# Patient Record
Sex: FEMALE | Race: WHITE | NOT HISPANIC OR LATINO
[De-identification: names, ages, dates, MRNs, and addresses within clinical notes are randomized per-mention and may not be internally consistent; named-entity substitution may affect disease eponyms.]

---

## 2021-08-27 ENCOUNTER — TRANSCRIPTION ENCOUNTER (OUTPATIENT)
Age: 26
End: 2021-08-27

## 2024-02-08 PROBLEM — Z00.00 ENCOUNTER FOR PREVENTIVE HEALTH EXAMINATION: Status: ACTIVE | Noted: 2024-02-08

## 2024-02-22 ENCOUNTER — NON-APPOINTMENT (OUTPATIENT)
Age: 29
End: 2024-02-22

## 2024-02-26 ENCOUNTER — APPOINTMENT (OUTPATIENT)
Dept: PLASTIC SURGERY | Facility: CLINIC | Age: 29
End: 2024-02-26
Payer: SELF-PAY

## 2024-02-26 VITALS
TEMPERATURE: 98.2 F | HEART RATE: 101 BPM | RESPIRATION RATE: 16 BRPM | DIASTOLIC BLOOD PRESSURE: 93 MMHG | HEIGHT: 67 IN | WEIGHT: 145 LBS | SYSTOLIC BLOOD PRESSURE: 145 MMHG | BODY MASS INDEX: 22.76 KG/M2 | OXYGEN SATURATION: 99 %

## 2024-02-26 DIAGNOSIS — Z78.9 OTHER SPECIFIED HEALTH STATUS: ICD-10-CM

## 2024-02-26 PROCEDURE — 99202 OFFICE O/P NEW SF 15 MIN: CPT

## 2024-02-26 RX ORDER — MULTIVITAMIN
CAPSULE ORAL
Refills: 0 | Status: DISCONTINUED | COMMUNITY
End: 2024-02-26

## 2024-04-02 ENCOUNTER — APPOINTMENT (OUTPATIENT)
Dept: PLASTIC SURGERY | Facility: CLINIC | Age: 29
End: 2024-04-02
Payer: SELF-PAY

## 2024-04-02 VITALS — WEIGHT: 145 LBS | TEMPERATURE: 98 F | BODY MASS INDEX: 22.76 KG/M2 | HEIGHT: 67 IN

## 2024-04-02 PROCEDURE — 99203 OFFICE O/P NEW LOW 30 MIN: CPT

## 2024-04-02 NOTE — HISTORY OF PRESENT ILLNESS
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 She expressed cosmetic concerns, particularly about her protruding ears and the shape of her left ear, which have made her self-conscious. However, she reports no functional issues such as hearing loss or a history of frequent ear infections.

## 2024-05-07 ENCOUNTER — APPOINTMENT (OUTPATIENT)
Dept: PLASTIC SURGERY | Facility: CLINIC | Age: 29
End: 2024-05-07
Payer: SELF-PAY

## 2024-05-07 PROCEDURE — 69300 REVISE EXTERNAL EAR: CPT

## 2024-05-07 RX ORDER — CEPHALEXIN 500 MG/1
500 TABLET ORAL
Qty: 6 | Refills: 0 | Status: ACTIVE | COMMUNITY
Start: 2024-05-07 | End: 1900-01-01

## 2024-05-07 RX ORDER — OXYCODONE AND ACETAMINOPHEN 5; 325 MG/1; MG/1
5-325 TABLET ORAL
Qty: 10 | Refills: 0 | Status: ACTIVE | COMMUNITY
Start: 2024-05-07 | End: 1900-01-01

## 2024-05-07 NOTE — PROCEDURE
[FreeTextEntry6] : preopdx dx: prominent ear, lop ear deformity procedure: otoplasty  lido w/ epi min none IC obtained antihelical fold marked.  retroauricular sulcus demarcated.  lido w/ epi injected.  15 blade used to incise skin to the cartilage.  subperichondrial plane dissected.  mustarde sutures placed with 4-0 clear nylon x4.  furnas suture placed.  Super helix skin everted.  Bolster placed to widen sulcus of super scalpha.  .  skin closed with 4-0 monocryl.  contralateral identical procedure performed but without bolster.

## 2024-05-07 NOTE — REASON FOR VISIT
[Procedure: _________] : a [unfilled] procedure visit [FreeTextEntry1] : otoplasty for prominent ear deformity

## 2024-05-10 ENCOUNTER — APPOINTMENT (OUTPATIENT)
Dept: PLASTIC SURGERY | Facility: CLINIC | Age: 29
End: 2024-05-10
Payer: SELF-PAY

## 2024-05-10 PROCEDURE — 99024 POSTOP FOLLOW-UP VISIT: CPT

## 2024-05-10 NOTE — HISTORY OF PRESENT ILLNESS
[FreeTextEntry1] : Dop:5/7/24 S/P:  otoplasty for prominent ear deformity. Patient is doing well, no complaints.

## 2024-05-15 ENCOUNTER — APPOINTMENT (OUTPATIENT)
Dept: PLASTIC SURGERY | Facility: CLINIC | Age: 29
End: 2024-05-15
Payer: SELF-PAY

## 2024-05-15 PROCEDURE — 99024 POSTOP FOLLOW-UP VISIT: CPT

## 2024-05-15 NOTE — HISTORY OF PRESENT ILLNESS
[FreeTextEntry1] : Dop:5/7/24 S/P: otoplasty for prominent ear deformity.  And ear deformity on left side Bolster in place superior left helical rim

## 2024-05-30 ENCOUNTER — APPOINTMENT (OUTPATIENT)
Dept: PLASTIC SURGERY | Facility: CLINIC | Age: 29
End: 2024-05-30
Payer: SELF-PAY

## 2024-05-30 DIAGNOSIS — Q17.5 PROMINENT EAR: ICD-10-CM

## 2024-05-30 PROCEDURE — 99024 POSTOP FOLLOW-UP VISIT: CPT
